# Patient Record
Sex: MALE | Race: WHITE | NOT HISPANIC OR LATINO | Employment: FULL TIME | URBAN - METROPOLITAN AREA
[De-identification: names, ages, dates, MRNs, and addresses within clinical notes are randomized per-mention and may not be internally consistent; named-entity substitution may affect disease eponyms.]

---

## 2018-12-04 ENCOUNTER — OFFICE VISIT (OUTPATIENT)
Dept: FAMILY MEDICINE CLINIC | Facility: CLINIC | Age: 53
End: 2018-12-04
Payer: COMMERCIAL

## 2018-12-04 VITALS
BODY MASS INDEX: 40.76 KG/M2 | WEIGHT: 276 LBS | SYSTOLIC BLOOD PRESSURE: 122 MMHG | HEART RATE: 84 BPM | DIASTOLIC BLOOD PRESSURE: 88 MMHG

## 2018-12-04 DIAGNOSIS — J45.909 UNCOMPLICATED ASTHMA, UNSPECIFIED ASTHMA SEVERITY, UNSPECIFIED WHETHER PERSISTENT: Primary | ICD-10-CM

## 2018-12-04 DIAGNOSIS — D48.9 NEOPLASM OF UNCERTAIN BEHAVIOR: ICD-10-CM

## 2018-12-04 DIAGNOSIS — L98.9 SKIN LESION: ICD-10-CM

## 2018-12-04 PROCEDURE — 11600 EXC TR-EXT MAL+MARG 0.5 CM/<: CPT | Performed by: FAMILY MEDICINE

## 2018-12-04 PROCEDURE — 99213 OFFICE O/P EST LOW 20 MIN: CPT | Performed by: FAMILY MEDICINE

## 2018-12-04 RX ORDER — ALBUTEROL SULFATE 90 UG/1
1-2 AEROSOL, METERED RESPIRATORY (INHALATION)
COMMUNITY
Start: 2014-10-31 | End: 2018-12-04 | Stop reason: SDUPTHER

## 2018-12-04 RX ORDER — ALBUTEROL SULFATE 90 UG/1
1-2 AEROSOL, METERED RESPIRATORY (INHALATION) EVERY 6 HOURS PRN
Qty: 1 INHALER | Refills: 0 | Status: SHIPPED | OUTPATIENT
Start: 2018-12-04 | End: 2019-03-20 | Stop reason: SDUPTHER

## 2018-12-04 NOTE — PROGRESS NOTES
Assessment/Plan:    Neoplasm of uncertain behavior  Skin lesion over the shoulder in the sun-exposed areas that is not healing for the last 6 weeks, excision using shaving technique was done  Follow up on the pathology report  Asthma  Intermittent and is stable, inhaler prescription was given  Diagnoses and all orders for this visit:    Uncomplicated asthma, unspecified asthma severity, unspecified whether persistent  -     albuterol (PROAIR HFA) 90 mcg/act inhaler; Inhale 1-2 puffs every 6 (six) hours as needed for wheezing    Neoplasm of uncertain behavior  -     Tissue Exam  -     Skin excision    Skin lesion  -     Tissue Exam  -     Skin excision    Other orders  -     Discontinue: albuterol (PROAIR HFA) 90 mcg/act inhaler; Inhale 1-2 puffs          Subjective: patient c/o a small cyst on his left shoulder for 1 month that is not healing  Patient has applied vaseline with no relief  Area is not itchy or painful  ak     Patient ID: Stanislaw King is a 48 y o  male  Patient have a skin lump on his left shoulder for the last 1 and half months, it started that the swelling and was healing and start crusting and has been crusting for the last 1 and half months, patient is concerned about it, he was exposed to sun a lot and he gets a lot of some burn  No history of skin cancer before  The following portions of the patient's history were reviewed and updated as appropriate: allergies, current medications, past family history, past medical history, past social history, past surgical history and problem list     Review of Systems   Constitutional: Negative for activity change, appetite change, chills, diaphoresis, fatigue and fever  HENT: Negative for congestion, postnasal drip, sinus pressure, sore throat and voice change  Eyes: Negative for pain, redness and visual disturbance  Respiratory: Negative for cough, chest tightness, shortness of breath and wheezing      Cardiovascular: Negative for chest pain, palpitations and leg swelling  Gastrointestinal: Negative for abdominal pain, constipation, diarrhea and nausea  Endocrine: Negative for cold intolerance, heat intolerance and polyuria  Genitourinary: Negative for dysuria, enuresis, flank pain and frequency  Musculoskeletal: Negative for gait problem, joint swelling and neck pain  Skin: Positive for wound  Negative for color change and rash  Neurological: Negative for dizziness, syncope, speech difficulty, numbness and headaches  Psychiatric/Behavioral: Negative for behavioral problems and confusion  The patient is not nervous/anxious  Objective:      /88   Pulse 84   Wt 125 kg (276 lb)   BMI 40 76 kg/m²          Physical Exam   Constitutional: He is oriented to person, place, and time  He appears well-developed and well-nourished  HENT:   Head: Normocephalic and atraumatic  Eyes: Pupils are equal, round, and reactive to light  Conjunctivae and EOM are normal    Neck: Normal range of motion  Neck supple  Cardiovascular: Normal rate, regular rhythm, normal heart sounds and intact distal pulses  Pulmonary/Chest: Effort normal and breath sounds normal    Abdominal: Soft  Bowel sounds are normal    Musculoskeletal: Normal range of motion  Neurological: He is alert and oriented to person, place, and time  He has normal reflexes  Skin: Skin is warm and dry  Psychiatric: He has a normal mood and affect  His behavior is normal    Nursing note and vitals reviewed  Skin excision  Date/Time: 12/4/2018 6:58 PM  Performed by: Dulce Maria Coronel  Authorized by: Dulce Maria Coronel     Procedure Details - Skin Excision:     Number of Lesions:  1  Lesion 1:     Body area:  Upper extremity    Upper extremity location:  L shoulder    Initial size (mm):  3       Malignancy: malignant lesion      Malignancy comment:  Unknown    Destruction method comment:  Scalpel      Lidocaine with epinephrine 1 mL was applied,  15  Scalpel was used, and the lesion was removed, sent for pathology, silver nitrate stick was applied, and the procedure tolerated well

## 2018-12-05 NOTE — ASSESSMENT & PLAN NOTE
Skin lesion over the shoulder in the sun-exposed areas that is not healing for the last 6 weeks, excision using shaving technique was done  Follow up on the pathology report

## 2018-12-05 NOTE — PATIENT INSTRUCTIONS
Excision of Skin Lesion   AMBULATORY CARE:   What you need to know about excision of a skin lesion:  Excision of a skin lesion is surgery to remove a piece of skin tissue  The skin tissue may be malignant (skin cancer) or it may be benign  Benign means the skin tissue does not have cancer cells and cannot spread  How to prepare for excision of a skin lesion:  Your healthcare provider will talk to you about how to prepare for surgery  He may tell you not to eat or drink anything after midnight on the day of your surgery  He will tell you what medicines to take or not take on the day of your surgery  You may be given an antibiotic through your IV to help prevent a bacterial infection  What will happen during excision of a skin lesion:  You will be given local anesthesia to numb the surgery area  With local anesthesia, you may still feel pressure or pushing during surgery, but you should not feel any sharp pain  Your healthcare provider will frida the area of your skin that will be removed  He will make an incision on the marked area  He will remove the outer layer of your skin  He may also remove deeper layers of tissue underneath your skin  He may use heat to stop any bleeding  He will close the incision with stitches, staples, tissue glue, or medical tape  He may cover your incision with a bandage  Your healthcare provider may send samples of your tissue to the lab for tests  What will happen after excision of a skin lesion:  Your stitches will need to be removed after a period of time  The amount of time depends on the part of the body where the surgery was done  Stitches on the face will be removed within 5 to 7 days  Stitches on the trunk of your body will be removed within 7 to 10 days  Stitches on your arms or legs will be removed within 10 to 14 days  Medical tape usually falls off on its own in about 7 to 10 days  Risks of excision of a skin lesion:  You may bleed more than expected or get an infection  You may lose feeling, or you may feel tingling or prickling in the surgery area  Your scar may not look the way you expected  It may also limit your movement or affect your expressions if you had surgery on your face  Seek care immediately if:   · Your stitches come apart and the wound opens  Contact your healthcare provider if:   · You have pain in your incision that does not get better with medicine  · You have a fever or chills  · Your wound is red, swollen, bleeding, or draining pus  · You have questions or concerns about your condition or care  Care for your wound as directed:  Carefully wash the wound with soap and water  Dry the area and put on new, clean bandages as directed  Change your bandages when they get wet or dirty  You may take a shower 24 hours after  your surgery  Do not  soak in water (bathtub, hot tub, swimming pool) until after your stitches have been removed  Check your wound for signs of infection such as redness, swelling, or pus drainage  Follow up with your healthcare provider as directed: You will need to return to have your stitches removed  Write down your questions so you remember to ask them during your visits  © 2017 [employer]  Information is for End User's use only and may not be sold, redistributed or otherwise used for commercial purposes  All illustrations and images included in CareNotes® are the copyrighted property of [employer]  or [employer]  The above information is an  only  It is not intended as medical advice for individual conditions or treatments  Talk to your doctor, nurse or pharmacist before following any medical regimen to see if it is safe and effective for you

## 2018-12-06 LAB
CLINICAL INFO: NORMAL
PATH REPORT.FINAL DX SPEC: NORMAL
PROCEDURE TYPE: NORMAL
SPECIMEN SOURCE: NORMAL

## 2018-12-10 DIAGNOSIS — D48.9 NEOPLASM OF UNCERTAIN BEHAVIOR: Primary | ICD-10-CM

## 2018-12-10 NOTE — PROGRESS NOTES
Biopsy positive for BCC   Pt referred to dermatology for further treatment options  Diagnoses and all orders for this visit:    Neoplasm of uncertain behavior  -     Ambulatory referral to Dermatology;  Future

## 2019-03-20 DIAGNOSIS — J45.909 UNCOMPLICATED ASTHMA, UNSPECIFIED ASTHMA SEVERITY, UNSPECIFIED WHETHER PERSISTENT: ICD-10-CM

## 2019-10-11 ENCOUNTER — OFFICE VISIT (OUTPATIENT)
Dept: FAMILY MEDICINE CLINIC | Facility: CLINIC | Age: 54
End: 2019-10-11
Payer: COMMERCIAL

## 2019-10-11 VITALS
WEIGHT: 273.4 LBS | HEIGHT: 69 IN | BODY MASS INDEX: 40.49 KG/M2 | DIASTOLIC BLOOD PRESSURE: 84 MMHG | HEART RATE: 80 BPM | OXYGEN SATURATION: 96 % | RESPIRATION RATE: 18 BRPM | SYSTOLIC BLOOD PRESSURE: 118 MMHG | TEMPERATURE: 97.5 F

## 2019-10-11 DIAGNOSIS — J45.909 UNCOMPLICATED ASTHMA, UNSPECIFIED ASTHMA SEVERITY, UNSPECIFIED WHETHER PERSISTENT: ICD-10-CM

## 2019-10-11 DIAGNOSIS — E66.01 MORBID OBESITY WITH BMI OF 40.0-44.9, ADULT (HCC): ICD-10-CM

## 2019-10-11 DIAGNOSIS — H60.503 ACUTE OTITIS EXTERNA OF BOTH EARS, UNSPECIFIED TYPE: Primary | ICD-10-CM

## 2019-10-11 DIAGNOSIS — J45.40 MODERATE PERSISTENT ASTHMA WITHOUT COMPLICATION: ICD-10-CM

## 2019-10-11 DIAGNOSIS — Z12.11 SCREEN FOR COLON CANCER: ICD-10-CM

## 2019-10-11 PROCEDURE — 99214 OFFICE O/P EST MOD 30 MIN: CPT | Performed by: PHYSICIAN ASSISTANT

## 2019-10-11 RX ORDER — ALBUTEROL SULFATE 90 UG/1
2 AEROSOL, METERED RESPIRATORY (INHALATION) EVERY 6 HOURS PRN
Qty: 8.5 INHALER | Refills: 0 | Status: SHIPPED | OUTPATIENT
Start: 2019-10-11 | End: 2019-11-03 | Stop reason: SDUPTHER

## 2019-10-11 RX ORDER — FLUTICASONE PROPIONATE 110 UG/1
2 AEROSOL, METERED RESPIRATORY (INHALATION) 2 TIMES DAILY
Qty: 1 INHALER | Refills: 3 | Status: SHIPPED | OUTPATIENT
Start: 2019-10-11

## 2019-10-11 RX ORDER — OFLOXACIN 3 MG/ML
5 SOLUTION AURICULAR (OTIC) 2 TIMES DAILY
Qty: 5 ML | Refills: 0 | Status: SHIPPED | OUTPATIENT
Start: 2019-10-11

## 2019-10-11 NOTE — PROGRESS NOTES
Assessment and Plan:  Patient Instructions   1  Bilateral otitis externa-recommend ofloxacin drops applied in the ears twice daily for 5-7 days  Follow up if symptoms would be persistent  2  Moderate persistent asthma-not at goal with short-acting agent alone  Would recommend adding Flovent HFA 2 puffs in the morning and 2 puffs in the evening  Follow-up in the next 2-4 weeks as necessary or if symptoms are persistent  3  Screening for colon cancer-referral for colonoscopy placed  4  Obesity-continue with diet and exercise efforts  Diagnoses and all orders for this visit:    Acute otitis externa of both ears, unspecified type  -     ofloxacin (FLOXIN) 0 3 % otic solution; Administer 5 drops into both ears 2 (two) times a day    Uncomplicated asthma, unspecified asthma severity, unspecified whether persistent  -     albuterol (PROAIR HFA) 90 mcg/act inhaler; Inhale 2 puffs every 6 (six) hours as needed for wheezing    Screen for colon cancer  -     Ambulatory referral to colonoscopy screening; Future    Moderate persistent asthma without complication  -     fluticasone (FLOVENT HFA) 110 MCG/ACT inhaler; Inhale 2 puffs 2 (two) times a day Rinse mouth after use  Morbid obesity with BMI of 40 0-44 9, adult ([facility])              Subjective:      Patient ID: Tano Rothman is a 48 y o  male  CC:    Chief Complaint   Patient presents with   [facility]     pt is having some right eat pain and fullness, for about 3 days, pt complains of ringing in both of his ears       HPI:    HPI:  This is a 61-year-old gentleman that presents to the office with concerns over ear discomfort and muffling and ringing that has been worse for the past week  He has had some history of ringing in the ears for quite a while but it does seem exacerbated recently  He has not had many cough or cold symptoms lately but does have some general allergies  He feels that he has some hayfever allergy yearly    This has improved in the past couple days with the colder weather  He does have a history of moderate persistent asthma but has been using his ProAir HFA inhaler every night  He feels that when he wakes up his chest is a little bit tight and wheezing  The following portions of the patient's history were reviewed and updated as appropriate: allergies, current medications, past family history, past medical history, past social history, past surgical history and problem list       Review of Systems   Constitutional: Negative for fever  HENT: Positive for ear pain and hearing loss  Negative for congestion  Respiratory: Negative for cough, chest tightness and shortness of breath  Cardiovascular: Negative for chest pain  Musculoskeletal: Negative for arthralgias  Data to review:       Objective:    Vitals:    10/11/19 1438   BP: 118/84   BP Location: Left arm   Patient Position: Sitting   Cuff Size: Adult   Pulse: 80   Resp: 18   Temp: 97 5 °F (36 4 °C)   TempSrc: Temporal   SpO2: 96%   Weight: 124 kg (273 lb 6 4 oz)   Height: 5' 9" (1 753 m)        Physical Exam   Constitutional: He is oriented to person, place, and time  He appears well-developed and well-nourished  HENT:   Head: Normocephalic  Bilateral canals are erythematous and edematous  Right tragal motion tenderness is present  Tympanic membranes are clear with positive cone of light  Cardiovascular: Normal rate and regular rhythm  Pulmonary/Chest: Effort normal and breath sounds normal  No respiratory distress  Abdominal: Soft  Musculoskeletal: Normal range of motion  Neurological: He is alert and oriented to person, place, and time  Psychiatric: He has a normal mood and affect  BMI Counseling: Body mass index is 40 37 kg/m²  The BMI is above normal  Nutrition recommendations include reducing portion sizes

## 2019-10-11 NOTE — PATIENT INSTRUCTIONS
1   Bilateral otitis externa-recommend ofloxacin drops applied in the ears twice daily for 5-7 days  Follow up if symptoms would be persistent  2  Moderate persistent asthma-not at goal with short-acting agent alone  Would recommend adding Flovent HFA 2 puffs in the morning and 2 puffs in the evening  Follow-up in the next 2-4 weeks as necessary or if symptoms are persistent  3  Screening for colon cancer-referral for colonoscopy placed  4  Obesity-continue with diet and exercise efforts

## 2019-11-03 DIAGNOSIS — J45.909 UNCOMPLICATED ASTHMA, UNSPECIFIED ASTHMA SEVERITY, UNSPECIFIED WHETHER PERSISTENT: ICD-10-CM

## 2025-08-19 ENCOUNTER — TELEPHONE (OUTPATIENT)
Age: 60
End: 2025-08-19